# Patient Record
Sex: MALE | Race: WHITE | Employment: OTHER | URBAN - METROPOLITAN AREA
[De-identification: names, ages, dates, MRNs, and addresses within clinical notes are randomized per-mention and may not be internally consistent; named-entity substitution may affect disease eponyms.]

---

## 2018-02-03 PROBLEM — R53.1 RIGHT SIDED WEAKNESS: Status: ACTIVE | Noted: 2018-02-03

## 2018-02-04 PROBLEM — E87.1 HYPONATREMIA: Status: ACTIVE | Noted: 2018-02-04

## 2018-02-04 PROBLEM — F17.200 TOBACCO DEPENDENCE: Status: ACTIVE | Noted: 2018-02-04

## 2018-02-04 PROBLEM — E16.2 HYPOGLYCEMIA: Status: ACTIVE | Noted: 2018-02-04

## 2018-02-04 PROBLEM — F10.10 ALCOHOL ABUSE: Status: ACTIVE | Noted: 2018-02-04

## 2018-02-04 PROBLEM — I63.9 ACUTE CVA (CEREBROVASCULAR ACCIDENT) (HCC): Status: ACTIVE | Noted: 2018-02-04

## 2018-12-05 ENCOUNTER — HOSPITAL ENCOUNTER (OUTPATIENT)
Age: 58
Discharge: HOME | End: 2018-12-07
Payer: MEDICARE

## 2018-12-05 ENCOUNTER — HOSPITAL ENCOUNTER (OUTPATIENT)
Dept: GENERAL RADIOLOGY | Age: 58
Discharge: HOME | End: 2018-12-07
Payer: MEDICARE

## 2018-12-05 DIAGNOSIS — M75.102 ROTATOR CUFF SYNDROME OF LEFT SHOULDER: ICD-10-CM

## 2018-12-05 PROCEDURE — 73030 X-RAY EXAM OF SHOULDER: CPT

## 2018-12-10 ENCOUNTER — HOSPITAL ENCOUNTER (OUTPATIENT)
Dept: MRI IMAGING | Age: 58
Discharge: HOME | End: 2018-12-12
Payer: MEDICARE

## 2018-12-10 DIAGNOSIS — M75.102 TEAR OF LEFT ROTATOR CUFF, UNSPECIFIED TEAR EXTENT: ICD-10-CM

## 2018-12-10 PROCEDURE — 73221 MRI JOINT UPR EXTREM W/O DYE: CPT

## 2019-07-28 ENCOUNTER — HOSPITAL ENCOUNTER (OUTPATIENT)
Age: 59
Setting detail: OBSERVATION
Discharge: HOME | End: 2019-07-29
Attending: EMERGENCY MEDICINE | Admitting: HOSPITALIST
Payer: MEDICARE

## 2019-07-28 DIAGNOSIS — E87.1 HYPONATREMIA: ICD-10-CM

## 2019-07-28 DIAGNOSIS — R29.90 STROKE-LIKE EPISODE: Primary | ICD-10-CM

## 2019-07-28 DIAGNOSIS — D72.829 LEUKOCYTOSIS, UNSPECIFIED TYPE: ICD-10-CM

## 2019-07-28 LAB
CHP ED QC CHECK: YES
GLUCOSE BLD-MCNC: 96 MG/DL
METER GLUCOSE: 96 MG/DL (ref 74–99)

## 2019-07-28 PROCEDURE — 2580000003 HC RX 258: Performed by: STUDENT IN AN ORGANIZED HEALTH CARE EDUCATION/TRAINING PROGRAM

## 2019-07-28 PROCEDURE — 99285 EMERGENCY DEPT VISIT HI MDM: CPT

## 2019-07-28 PROCEDURE — 82962 GLUCOSE BLOOD TEST: CPT

## 2019-07-28 RX ORDER — SODIUM CHLORIDE 0.9 % (FLUSH) 0.9 %
10 SYRINGE (ML) INJECTION PRN
Status: DISCONTINUED | OUTPATIENT
Start: 2019-07-28 | End: 2019-07-29 | Stop reason: SDUPTHER

## 2019-07-28 RX ORDER — SODIUM CHLORIDE 0.9 % (FLUSH) 0.9 %
10 SYRINGE (ML) INJECTION EVERY 12 HOURS SCHEDULED
Status: DISCONTINUED | OUTPATIENT
Start: 2019-07-29 | End: 2019-07-29 | Stop reason: SDUPTHER

## 2019-07-28 RX ORDER — 0.9 % SODIUM CHLORIDE 0.9 %
1000 INTRAVENOUS SOLUTION INTRAVENOUS ONCE
Status: COMPLETED | OUTPATIENT
Start: 2019-07-29 | End: 2019-07-29

## 2019-07-28 RX ADMIN — SODIUM CHLORIDE 1000 ML: 9 INJECTION, SOLUTION INTRAVENOUS at 23:59

## 2019-07-29 ENCOUNTER — APPOINTMENT (OUTPATIENT)
Dept: CT IMAGING | Age: 59
End: 2019-07-29
Payer: MEDICARE

## 2019-07-29 ENCOUNTER — APPOINTMENT (OUTPATIENT)
Dept: ULTRASOUND IMAGING | Age: 59
End: 2019-07-29
Payer: MEDICARE

## 2019-07-29 ENCOUNTER — APPOINTMENT (OUTPATIENT)
Dept: MRI IMAGING | Age: 59
End: 2019-07-29
Payer: MEDICARE

## 2019-07-29 ENCOUNTER — APPOINTMENT (OUTPATIENT)
Dept: GENERAL RADIOLOGY | Age: 59
End: 2019-07-29
Payer: MEDICARE

## 2019-07-29 VITALS
HEIGHT: 68 IN | RESPIRATION RATE: 16 BRPM | TEMPERATURE: 97.5 F | SYSTOLIC BLOOD PRESSURE: 127 MMHG | DIASTOLIC BLOOD PRESSURE: 78 MMHG | BODY MASS INDEX: 21.22 KG/M2 | OXYGEN SATURATION: 96 % | WEIGHT: 140 LBS | HEART RATE: 84 BPM

## 2019-07-29 PROBLEM — R29.90 STROKE-LIKE SYMPTOMS: Status: ACTIVE | Noted: 2019-07-29

## 2019-07-29 PROBLEM — R29.90 STROKE-LIKE EPISODE: Status: ACTIVE | Noted: 2019-07-29

## 2019-07-29 LAB
ALBUMIN SERPL-MCNC: 3.6 G/DL (ref 3.5–5.2)
ALP BLD-CCNC: 62 U/L (ref 40–129)
ALT SERPL-CCNC: 13 U/L (ref 0–40)
ANION GAP SERPL CALCULATED.3IONS-SCNC: 17 MMOL/L (ref 7–16)
APTT: 28.1 SEC (ref 24.5–35.1)
AST SERPL-CCNC: 25 U/L (ref 0–39)
BASOPHILS ABSOLUTE: 0.04 E9/L (ref 0–0.2)
BASOPHILS RELATIVE PERCENT: 0.3 % (ref 0–2)
BILIRUB SERPL-MCNC: 0.6 MG/DL (ref 0–1.2)
BILIRUBIN URINE: NEGATIVE
BLOOD, URINE: NEGATIVE
BUN BLDV-MCNC: 5 MG/DL (ref 8–23)
CALCIUM SERPL-MCNC: 8.8 MG/DL (ref 8.6–10.2)
CHLORIDE BLD-SCNC: 87 MMOL/L (ref 98–107)
CHOLESTEROL, FASTING: 112 MG/DL (ref 0–199)
CLARITY: CLEAR
CO2: 21 MMOL/L (ref 22–29)
COLOR: YELLOW
CREAT SERPL-MCNC: 0.7 MG/DL (ref 0.7–1.2)
EKG ATRIAL RATE: 94 BPM
EKG P AXIS: 79 DEGREES
EKG P-R INTERVAL: 116 MS
EKG Q-T INTERVAL: 374 MS
EKG QRS DURATION: 64 MS
EKG QTC CALCULATION (BAZETT): 467 MS
EKG R AXIS: 70 DEGREES
EKG T AXIS: 70 DEGREES
EKG VENTRICULAR RATE: 94 BPM
EOSINOPHILS ABSOLUTE: 0.14 E9/L (ref 0.05–0.5)
EOSINOPHILS RELATIVE PERCENT: 1.1 % (ref 0–6)
FOLATE: >20 NG/ML (ref 4.8–24.2)
GFR AFRICAN AMERICAN: >60
GFR NON-AFRICAN AMERICAN: >60 ML/MIN/1.73
GLUCOSE BLD-MCNC: 90 MG/DL (ref 74–99)
GLUCOSE URINE: NEGATIVE MG/DL
HBA1C MFR BLD: 4.8 % (ref 4–5.6)
HCT VFR BLD CALC: 36.3 % (ref 37–54)
HDLC SERPL-MCNC: 47 MG/DL
HEMOGLOBIN: 12.7 G/DL (ref 12.5–16.5)
IMMATURE GRANULOCYTES #: 0.09 E9/L
IMMATURE GRANULOCYTES %: 0.7 % (ref 0–5)
INR BLD: 1.1
KETONES, URINE: NEGATIVE MG/DL
LDL CHOLESTEROL CALCULATED: 53 MG/DL (ref 0–99)
LEUKOCYTE ESTERASE, URINE: NEGATIVE
LV EF: 58 %
LVEF MODALITY: NORMAL
LYMPHOCYTES ABSOLUTE: 2.17 E9/L (ref 1.5–4)
LYMPHOCYTES RELATIVE PERCENT: 17.1 % (ref 20–42)
MAGNESIUM: 1.7 MG/DL (ref 1.6–2.6)
MCH RBC QN AUTO: 32.6 PG (ref 26–35)
MCHC RBC AUTO-ENTMCNC: 35 % (ref 32–34.5)
MCV RBC AUTO: 93.1 FL (ref 80–99.9)
MONOCYTES ABSOLUTE: 1.12 E9/L (ref 0.1–0.95)
MONOCYTES RELATIVE PERCENT: 8.8 % (ref 2–12)
NEUTROPHILS ABSOLUTE: 9.11 E9/L (ref 1.8–7.3)
NEUTROPHILS RELATIVE PERCENT: 72 % (ref 43–80)
NITRITE, URINE: NEGATIVE
PDW BLD-RTO: 13.7 FL (ref 11.5–15)
PH UA: 6 (ref 5–9)
PLATELET # BLD: 183 E9/L (ref 130–450)
PMV BLD AUTO: 9.6 FL (ref 7–12)
POTASSIUM REFLEX MAGNESIUM: 3.5 MMOL/L (ref 3.5–5)
PROTEIN UA: NEGATIVE MG/DL
PROTHROMBIN TIME: 13 SEC (ref 9.3–12.4)
RBC # BLD: 3.9 E12/L (ref 3.8–5.8)
SODIUM BLD-SCNC: 125 MMOL/L (ref 132–146)
SPECIFIC GRAVITY UA: <=1.005 (ref 1–1.03)
TOTAL PROTEIN: 7 G/DL (ref 6.4–8.3)
TRIGLYCERIDE, FASTING: 58 MG/DL (ref 0–149)
TROPONIN: <0.01 NG/ML (ref 0–0.03)
UROBILINOGEN, URINE: 0.2 E.U./DL
VITAMIN B-12: 1008 PG/ML (ref 211–946)
VLDLC SERPL CALC-MCNC: 12 MG/DL
WBC # BLD: 12.7 E9/L (ref 4.5–11.5)

## 2019-07-29 PROCEDURE — 6360000002 HC RX W HCPCS: Performed by: STUDENT IN AN ORGANIZED HEALTH CARE EDUCATION/TRAINING PROGRAM

## 2019-07-29 PROCEDURE — 96365 THER/PROPH/DIAG IV INF INIT: CPT

## 2019-07-29 PROCEDURE — 93005 ELECTROCARDIOGRAM TRACING: CPT | Performed by: STUDENT IN AN ORGANIZED HEALTH CARE EDUCATION/TRAINING PROGRAM

## 2019-07-29 PROCEDURE — 80053 COMPREHEN METABOLIC PANEL: CPT

## 2019-07-29 PROCEDURE — G0378 HOSPITAL OBSERVATION PER HR: HCPCS

## 2019-07-29 PROCEDURE — 36415 COLL VENOUS BLD VENIPUNCTURE: CPT

## 2019-07-29 PROCEDURE — 2580000003 HC RX 258: Performed by: STUDENT IN AN ORGANIZED HEALTH CARE EDUCATION/TRAINING PROGRAM

## 2019-07-29 PROCEDURE — 6360000002 HC RX W HCPCS: Performed by: HOSPITALIST

## 2019-07-29 PROCEDURE — 85730 THROMBOPLASTIN TIME PARTIAL: CPT

## 2019-07-29 PROCEDURE — 93010 ELECTROCARDIOGRAM REPORT: CPT | Performed by: INTERNAL MEDICINE

## 2019-07-29 PROCEDURE — 85025 COMPLETE CBC W/AUTO DIFF WBC: CPT

## 2019-07-29 PROCEDURE — 96366 THER/PROPH/DIAG IV INF ADDON: CPT

## 2019-07-29 PROCEDURE — 6370000000 HC RX 637 (ALT 250 FOR IP): Performed by: HOSPITALIST

## 2019-07-29 PROCEDURE — 70450 CT HEAD/BRAIN W/O DYE: CPT

## 2019-07-29 PROCEDURE — 84484 ASSAY OF TROPONIN QUANT: CPT

## 2019-07-29 PROCEDURE — 97161 PT EVAL LOW COMPLEX 20 MIN: CPT | Performed by: PHYSICAL THERAPIST

## 2019-07-29 PROCEDURE — 93306 TTE W/DOPPLER COMPLETE: CPT

## 2019-07-29 PROCEDURE — 93880 EXTRACRANIAL BILAT STUDY: CPT

## 2019-07-29 PROCEDURE — 97165 OT EVAL LOW COMPLEX 30 MIN: CPT

## 2019-07-29 PROCEDURE — 83735 ASSAY OF MAGNESIUM: CPT

## 2019-07-29 PROCEDURE — 2500000003 HC RX 250 WO HCPCS: Performed by: STUDENT IN AN ORGANIZED HEALTH CARE EDUCATION/TRAINING PROGRAM

## 2019-07-29 PROCEDURE — 70551 MRI BRAIN STEM W/O DYE: CPT

## 2019-07-29 PROCEDURE — 92523 SPEECH SOUND LANG COMPREHEN: CPT

## 2019-07-29 PROCEDURE — 83036 HEMOGLOBIN GLYCOSYLATED A1C: CPT

## 2019-07-29 PROCEDURE — 97530 THERAPEUTIC ACTIVITIES: CPT | Performed by: PHYSICAL THERAPIST

## 2019-07-29 PROCEDURE — 71045 X-RAY EXAM CHEST 1 VIEW: CPT

## 2019-07-29 PROCEDURE — 81003 URINALYSIS AUTO W/O SCOPE: CPT

## 2019-07-29 PROCEDURE — 82607 VITAMIN B-12: CPT

## 2019-07-29 PROCEDURE — 96372 THER/PROPH/DIAG INJ SC/IM: CPT

## 2019-07-29 PROCEDURE — 80061 LIPID PANEL: CPT

## 2019-07-29 PROCEDURE — 85610 PROTHROMBIN TIME: CPT

## 2019-07-29 PROCEDURE — 96376 TX/PRO/DX INJ SAME DRUG ADON: CPT

## 2019-07-29 PROCEDURE — 82746 ASSAY OF FOLIC ACID SERUM: CPT

## 2019-07-29 PROCEDURE — 97530 THERAPEUTIC ACTIVITIES: CPT

## 2019-07-29 PROCEDURE — 99218 PR INITIAL OBSERVATION CARE/DAY 30 MINUTES: CPT | Performed by: PSYCHIATRY & NEUROLOGY

## 2019-07-29 RX ORDER — TAMSULOSIN HYDROCHLORIDE 0.4 MG/1
0.4 CAPSULE ORAL DAILY
Status: DISCONTINUED | OUTPATIENT
Start: 2019-07-29 | End: 2019-07-29 | Stop reason: HOSPADM

## 2019-07-29 RX ORDER — FOLIC ACID 1 MG/1
1 TABLET ORAL DAILY
Status: DISCONTINUED | OUTPATIENT
Start: 2019-07-29 | End: 2019-07-29 | Stop reason: HOSPADM

## 2019-07-29 RX ORDER — ALBUTEROL SULFATE 90 UG/1
1 AEROSOL, METERED RESPIRATORY (INHALATION) EVERY 6 HOURS PRN
Status: DISCONTINUED | OUTPATIENT
Start: 2019-07-29 | End: 2019-07-29 | Stop reason: HOSPADM

## 2019-07-29 RX ORDER — DULOXETIN HYDROCHLORIDE 30 MG/1
30 CAPSULE, DELAYED RELEASE ORAL DAILY
Status: DISCONTINUED | OUTPATIENT
Start: 2019-07-29 | End: 2019-07-29 | Stop reason: HOSPADM

## 2019-07-29 RX ORDER — ATORVASTATIN CALCIUM 40 MG/1
40 TABLET, FILM COATED ORAL NIGHTLY
Status: DISCONTINUED | OUTPATIENT
Start: 2019-07-29 | End: 2019-07-29 | Stop reason: HOSPADM

## 2019-07-29 RX ORDER — ONDANSETRON 2 MG/ML
4 INJECTION INTRAMUSCULAR; INTRAVENOUS EVERY 6 HOURS PRN
Status: DISCONTINUED | OUTPATIENT
Start: 2019-07-29 | End: 2019-07-29 | Stop reason: HOSPADM

## 2019-07-29 RX ORDER — ASPIRIN 81 MG/1
81 TABLET ORAL DAILY
Status: DISCONTINUED | OUTPATIENT
Start: 2019-07-29 | End: 2019-07-29 | Stop reason: HOSPADM

## 2019-07-29 RX ORDER — THIAMINE MONONITRATE (VIT B1) 100 MG
100 TABLET ORAL DAILY
Status: DISCONTINUED | OUTPATIENT
Start: 2019-07-29 | End: 2019-07-29 | Stop reason: HOSPADM

## 2019-07-29 RX ORDER — SODIUM CHLORIDE 0.9 % (FLUSH) 0.9 %
10 SYRINGE (ML) INJECTION EVERY 12 HOURS SCHEDULED
Status: DISCONTINUED | OUTPATIENT
Start: 2019-07-29 | End: 2019-07-29 | Stop reason: HOSPADM

## 2019-07-29 RX ORDER — MULTIVITAMIN WITH FOLIC ACID 400 MCG
1 TABLET ORAL DAILY
Status: DISCONTINUED | OUTPATIENT
Start: 2019-07-29 | End: 2019-07-29 | Stop reason: HOSPADM

## 2019-07-29 RX ORDER — 0.9 % SODIUM CHLORIDE 0.9 %
1000 INTRAVENOUS SOLUTION INTRAVENOUS ONCE
Status: COMPLETED | OUTPATIENT
Start: 2019-07-29 | End: 2019-07-29

## 2019-07-29 RX ORDER — SODIUM CHLORIDE 0.9 % (FLUSH) 0.9 %
10 SYRINGE (ML) INJECTION PRN
Status: DISCONTINUED | OUTPATIENT
Start: 2019-07-29 | End: 2019-07-29 | Stop reason: HOSPADM

## 2019-07-29 RX ADMIN — ASPIRIN 81 MG: 81 TABLET ORAL at 10:33

## 2019-07-29 RX ADMIN — TAMSULOSIN HYDROCHLORIDE 0.4 MG: 0.4 CAPSULE ORAL at 10:32

## 2019-07-29 RX ADMIN — FOLIC ACID 1 MG: 1 TABLET ORAL at 10:32

## 2019-07-29 RX ADMIN — MULTIVITAMIN TABLET 1 TABLET: TABLET at 10:33

## 2019-07-29 RX ADMIN — TIOTROPIUM BROMIDE 18 MCG: 18 CAPSULE ORAL; RESPIRATORY (INHALATION) at 13:43

## 2019-07-29 RX ADMIN — ENOXAPARIN SODIUM 40 MG: 40 INJECTION SUBCUTANEOUS at 10:32

## 2019-07-29 RX ADMIN — THIAMINE HYDROCHLORIDE: 100 INJECTION, SOLUTION INTRAMUSCULAR; INTRAVENOUS at 01:00

## 2019-07-29 RX ADMIN — Medication 100 MG: at 10:33

## 2019-07-29 RX ADMIN — MOMETASONE FUROATE AND FORMOTEROL FUMARATE DIHYDRATE 2 PUFF: 200; 5 AEROSOL RESPIRATORY (INHALATION) at 10:33

## 2019-07-29 RX ADMIN — SODIUM CHLORIDE 1000 ML: 9 INJECTION, SOLUTION INTRAVENOUS at 01:30

## 2019-07-29 RX ADMIN — THIAMINE HYDROCHLORIDE: 100 INJECTION, SOLUTION INTRAMUSCULAR; INTRAVENOUS at 10:34

## 2019-07-29 RX ADMIN — DULOXETINE HYDROCHLORIDE 30 MG: 30 CAPSULE, DELAYED RELEASE ORAL at 10:33

## 2019-07-29 ASSESSMENT — ENCOUNTER SYMPTOMS
VOMITING: 0
TROUBLE SWALLOWING: 0
SHORTNESS OF BREATH: 0
WHEEZING: 0
COUGH: 0
CHEST TIGHTNESS: 1
BACK PAIN: 0
SINUS PRESSURE: 0
SHORTNESS OF BREATH: 1
EYE DISCHARGE: 0
EYE PAIN: 0
PHOTOPHOBIA: 0
DIARRHEA: 0
NAUSEA: 0
EYE REDNESS: 0
SORE THROAT: 0
ABDOMINAL PAIN: 0

## 2019-07-29 ASSESSMENT — PAIN SCALES - GENERAL: PAINLEVEL_OUTOF10: 0

## 2019-07-29 NOTE — PROGRESS NOTES
[facility]  Physical Therapy Initial Evaluation    Name: Krista Matthew  : 1960  MRN: [id number]    Date of Service: 2019        Evaluating Therapist: William Maya PT, DPT       Equipment Recommendations: w/w    Room #: 8211/8211-A  DIAGNOSIS: stroke like episode   PRECAUTIONS: fall risk     Social:  Pt lives with daughter in a 2 floor plan 4 steps and 1 rails to enter. Bed and bath on second floor. 1 rail on steps to second floor. Prior to admission pt walked with w/w and uses cane on steps. Initial Evaluation  Date:  Treatment      Short Term/ Long Term   Goals   Was pt agreeable to Eval/treatment? yes     Does pt have pain? No c/o pain     Bed Mobility  Rolling: SBA  Supine to sit: SBA  Sit to supine: SBA  Scooting: SBA  Modified independent   Transfers Sit to stand: min A   Stand to sit: min A   Stand pivot: min a   SBa   Ambulation    30 feet with w/w  with min A   75+ feet with w/w with SBA   Stair negotiation: ascended and descended  NT  4 steps with 1 rail with CGA   AM-PAC Raw Score 17/24       Pt is alert and Oriented x3   ROM:  L LE grossly WFL  R LE grossly WFL  Strength:   L LE grossly 4-/5  R LE grossly 3+/5  Balance: standing min A with w/w   Sensation: pt reports numbness/tingling chronic in R fingers and in R LE. Edema: none noted. Endurance: fair        ASSESSMENT  Pt displays functional ability as noted in the objective portion of this evaluation. Comments/Treatment:  Pt found laying in bed agreeable to evaluation. Pt instructed in mobility. Pt found to be incontinent of bowel. Pt assisted with change of clothes, hygiene care, and linen change. Pt left laying in bed with call button in reach end of evaluation. Patient education  Pt educated on mobility. Patient response to education:   Pt verbalized understanding Pt demonstrated skill Pt requires further education in this area     x     Rehab potential is Good for reaching above PT goals.     Pts/ family goals   1. None stated. Patient and or family understand(s) diagnosis, prognosis, and plan of care. PLAN  PT care will be provided in accordance with the objectives noted above. Whenever appropriate, clear delegation orders will be provided for nursing staff. Exercises and functional mobility practice will be used as well as appropriate assistive devices or modalities to obtain goals. Patient and family education will also be administered as needed. Frequency of treatments will be 2-5x/week x 4-5 days.     Time in: 1335  Time out: 1359  Evaluation + 10  minutes tx time    Julisa Pulliam PT, DPT   License number:  [id number]

## 2019-07-29 NOTE — ED NOTES
This nurse spoke with Senthil Sampson RN at . Confirmation of ED SBAR receipt and pending transportation request is verbalized. Patient transportation to room 8211A requested.      Damian Robins RN  07/29/19 0666

## 2019-07-29 NOTE — PROGRESS NOTES
Conservation Techniques [x]  Functional Transfer training [x] Patient and/or Family Education [x]  Functional Mobility training [x]  Environmental Modifications [x]  Cognitive re-training []   Compensatory techniques for ADLs [x]  Splinting Needs []   Positioning to improve overall function [x]   Therapeutic Activity [x]  Therapeutic Exercise  [x]  Visual/Perceptual: []    Delirium prevention/treatment  []   Other:  []    Rehab Potential: Good for established goals     Patient / Family Goal: None stated     Patient and/or family were instructed diagnosis, prognosis/goals and plan of care. Demonstrated fair understanding.     Tx Time in: 140  Tx Time out: 158  Evaluation + 18 treatment time    Savannah Horta, OTR/L [id number]

## 2019-07-29 NOTE — PROGRESS NOTES
Patient/family provided with blue stroke education packet, contents reviewed and any/all questions answered. Instructed on importance of follow up appointments.      AVS reviewed with patient/family    IV site and telepack removed

## 2019-07-29 NOTE — PROGRESS NOTES
SPEECH/LANGUAGE PATHOLOGY  SPEECH/LANGUAGE/COGNITIVE EVALUATION      PATIENT NAME:  Radha Yu      :  1960      TODAY'S DATE:  2019      SPEECH PATHOLOGY DIAGNOSIS:    Mild cognitive deficits     THERAPY RECOMMENDATIONS:   Speech Pathology intervention is not warranted at this time. Per pt, memory at baseline                  MOTOR SPEECH       Oral Peripheral Examination   Adequate lingual/labial strength     Parameters of Speech Production  Respiration:  Adequate for speech production  Articulation:  Within functional limits  Resonance:  Within functional limits  Quality:   Within functional limits  Pitch: Within functional limits  Intensity: Within functional limits  Fluency:  Intact  Prosody Intact    RECEPTIVE LANGUAGE    Comprehension of Yes/No Questions: Within normal limits    Process  Simple Verbal Commands:   Within normal limits  Process Intermediate Verbal Commands:   Within normal limits  Process Complex Verbal Commands:     Within normal limits    Comprehension of Conversation:      Within normal limits      EXPRESSIVE LANGUAGE     Serials: Functional    Imitation:  Words   Functional   Sentences Functional    Naming:  (Modality used:  Verbal)  Confrontation Naming  Functional  Functional Description  Functional  Response Naming: Functional    Conversation:      Conversation was within functional limits    COGNITION     Attention/Orientation  Attention: Sustained attention   Orientation:  Oriented to Person, Place    Memory   Biographical:  recalled Address, Birthdate, Age and Family  Delayed recall:  recalled 0/3 words    CLINICAL OBSERVATIONS NOTED DURING THE EVALUATION  Within functional limits                  Prognosis for improvements is good  This plan will be re-evaluated and revised in 1 week  if warranted. Patient stated goals: Agreed with above  Treatment goals discussed with Patient  The Patient understand the diagnosis, prognosis and plan of care.       The admitting diagnosis and active problem list, as listed below have been reviewed prior to initiation of this evaluation.      ADMITTING DIAGNOSIS: Stroke-like episode ([facility] [address] [I63.9]     ACTIVE PROBLEM LIST:   Patient Active Problem List   Diagnosis    DISABILITY EXAMINATION    Right sided weakness    Acute CVA (cerebrovascular accident) ([facility] [address]    Hyponatremia    Alcohol abuse    Hypoglycemia    Tobacco dependence    [facility]     [facility]  Speech Language Pathologist Student Intern

## 2019-07-29 NOTE — H&P
rhythm with normal S1/S2 without murmurs, rubs or gallops. Abdomen: Soft, non-tender, non-distended with normal bowel sounds. Musculoskeletal:  No clubbing, cyanosis or edema bilaterally. Full range of motion without deformity. Skin: Skin color, texture, turgor normal.  No rashes or lesions. Neurologic:  Neurovascularly intact without any focal sensory/motor deficits. Cranial nerves: II-XII intact, grossly non-focal.  Psychiatric:  Alert and oriented, thought content appropriate, normal insight    EKG:  I have reviewed the EKG with the following interpretation: sinus tachycardia    . CT Head WO Contrast   Final Result   No CT evidence for an acute intracranial process. There is no CT evidence for    acute hemorrhage, acute infarct or mass. This report has been electronically signed by Smiley Gentile DO. XR CHEST PORTABLE   Final Result   No acute cardiopulmonary process.       MRI BRAIN WO CONTRAST    (Results Pending)   US CAROTID ARTERY BILATERAL    (Results Pending)         Labs:     Recent Labs     07/29/19  0001   WBC 12.7*   HGB 12.7   HCT 36.3*        Recent Labs     07/29/19  0001   *   K 3.5   CL 87*   CO2 21*   BUN 5*   CREATININE 0.7   CALCIUM 8.8     Recent Labs     07/29/19  0001   AST 25   ALT 13   BILITOT 0.6   ALKPHOS 62     Recent Labs     07/29/19  0001   INR 1.1     Recent Labs     07/29/19  0001   TROPONINI <0.01       Urinalysis:      Lab Results   Component Value Date    NITRU Negative 07/29/2019    BLOODU Negative 07/29/2019    SPECGRAV <=1.005 07/29/2019    GLUCOSEU Negative 07/29/2019         ASSESSMENT:  Stroke like symptoms  Alcohol intoxication w/ hx of abuse  Hyponatremia   Cerebral artery occlusion with hx of cerebral cva  COPD  Depression  HTN  Mild leukocytosis - likely 2/2 dehydration     PLAN:  Neuro workup pending  Neurology consulted  CIWA   Monitor labs  Continue home meds  PT/OT  Pt is hoping to go home today       DVT Prophylaxis: lovenox  Diet: DIET

## 2019-07-29 NOTE — ED PROVIDER NOTES
breath sounds normal. No respiratory distress. He has no wheezes. He has no rales. Tight; Diminished b/l   Abdominal: Soft. Bowel sounds are normal. There is no tenderness. There is no rebound and no guarding. Musculoskeletal: He exhibits no edema. Neurological: No cranial nerve deficit. Coordination abnormal.   Decreased motor strength all extremities - worse RLE. AOx1   Skin: Skin is warm and dry. Nursing note and vitals reviewed. Procedures    NIH Stroke Scale      Interval: Baseline  Time: 1:36 AM  Person Administering Scale: Torie Wu    Administer stroke scale items in the order listed. Record performance in each category after each subscale exam. Do not go back and change scores. Follow directions provided for each exam technique. Scores should reflect what the patient does, not what the clinician thinks the patient can do. The clinician should record answers while administering the exam and work quickly. Except where indicated, the patient should not be coached (i.e., repeated requests to patient to make a special effort). 1a  Level of consciousness: 0=alert; keenly responsive   1b. LOC questions:  0=Performs both tasks correctly   1c. LOC commands: 0=Performs both tasks correctly   2. Best Gaze: 0=normal   3. Visual: 0=No visual loss   4. Facial Palsy: 0=Normal symmetric movement   5a. Motor left arm: 2=Some effort against gravity, limb cannot get to or maintain (if cured) 90 (or 45) degrees, drifts down to bed, but has some effort against gravity   5b.   Motor right arm: 2=Some effort against gravity, limb cannot get to or maintain (if cured) 90 (or 45) degrees, drifts down to bed, but has some effort against gravity   6a. motor left le=Some effort against gravity, limb cannot get to or maintain (if cured) 90 (or 45) degrees, drifts down to bed, but has some effort against gravity   6b  Motor right le=Some effort against gravity, limb cannot get to or maintain (if cured) 90 Finger surgery (Left). Social History:  reports that he has been smoking. He has been smoking about 2.00 packs per day. He has quit using smokeless tobacco. He reports that he drinks about 12.0 standard drinks of alcohol per week. He reports that he has current or past drug history. Drug: Marijuana. Family History: family history is not on file. The patients home medications have been reviewed.     Allergies: Bee venom    -------------------------------------------------- RESULTS -------------------------------------------------    LABS:  Results for orders placed or performed during the hospital encounter of 07/28/19   CBC Auto Differential   Result Value Ref Range    WBC 12.7 (H) 4.5 - 11.5 E9/L    RBC 3.90 3.80 - 5.80 E12/L    Hemoglobin 12.7 12.5 - 16.5 g/dL    Hematocrit 36.3 (L) 37.0 - 54.0 %    MCV 93.1 80.0 - 99.9 fL    MCH 32.6 26.0 - 35.0 pg    MCHC 35.0 (H) 32.0 - 34.5 %    RDW 13.7 11.5 - 15.0 fL    Platelets 586 439 - 225 E9/L    MPV 9.6 7.0 - 12.0 fL    Neutrophils % 72.0 43.0 - 80.0 %    Immature Granulocytes % 0.7 0.0 - 5.0 %    Lymphocytes % 17.1 (L) 20.0 - 42.0 %    Monocytes % 8.8 2.0 - 12.0 %    Eosinophils % 1.1 0.0 - 6.0 %    Basophils % 0.3 0.0 - 2.0 %    Neutrophils # 9.11 (H) 1.80 - 7.30 E9/L    Immature Granulocytes # 0.09 E9/L    Lymphocytes # 2.17 1.50 - 4.00 E9/L    Monocytes # 1.12 (H) 0.10 - 0.95 E9/L    Eosinophils # 0.14 0.05 - 0.50 E9/L    Basophils # 0.04 0.00 - 0.20 E9/L   Comprehensive Metabolic Panel w/ Reflex to MG   Result Value Ref Range    Sodium 125 (L) 132 - 146 mmol/L    Potassium reflex Magnesium 3.5 3.5 - 5.0 mmol/L    Chloride 87 (L) 98 - 107 mmol/L    CO2 21 (L) 22 - 29 mmol/L    Anion Gap 17 (H) 7 - 16 mmol/L    Glucose 90 74 - 99 mg/dL    BUN 5 (L) 8 - 23 mg/dL    CREATININE 0.7 0.7 - 1.2 mg/dL    GFR Non-African American >60 >=60 mL/min/1.73    GFR African American >60     Calcium 8.8 8.6 - 10.2 mg/dL    Total Protein 7.0 6.4 - 8.3 g/dL    Alb 3.6 3.5 -

## 2020-02-05 ENCOUNTER — HOSPITAL ENCOUNTER (EMERGENCY)
Age: 60
Discharge: HOME | End: 2020-02-06
Attending: EMERGENCY MEDICINE
Payer: MEDICARE

## 2020-02-05 ENCOUNTER — APPOINTMENT (OUTPATIENT)
Dept: CT IMAGING | Age: 60
End: 2020-02-05
Payer: MEDICARE

## 2020-02-05 ENCOUNTER — APPOINTMENT (OUTPATIENT)
Dept: GENERAL RADIOLOGY | Age: 60
End: 2020-02-05
Payer: MEDICARE

## 2020-02-05 VITALS
SYSTOLIC BLOOD PRESSURE: 135 MMHG | RESPIRATION RATE: 18 BRPM | HEART RATE: 100 BPM | DIASTOLIC BLOOD PRESSURE: 101 MMHG | OXYGEN SATURATION: 95 % | TEMPERATURE: 97.9 F

## 2020-02-05 LAB
BASOPHILS ABSOLUTE: 0.07 E9/L (ref 0–0.2)
BASOPHILS RELATIVE PERCENT: 0.8 % (ref 0–2)
EOSINOPHILS ABSOLUTE: 0.3 E9/L (ref 0.05–0.5)
EOSINOPHILS RELATIVE PERCENT: 3.2 % (ref 0–6)
HCT VFR BLD CALC: 41.6 % (ref 37–54)
HEMOGLOBIN: 14.1 G/DL (ref 12.5–16.5)
IMMATURE GRANULOCYTES #: 0.03 E9/L
IMMATURE GRANULOCYTES %: 0.3 % (ref 0–5)
LYMPHOCYTES ABSOLUTE: 1.46 E9/L (ref 1.5–4)
LYMPHOCYTES RELATIVE PERCENT: 15.7 % (ref 20–42)
MCH RBC QN AUTO: 31.3 PG (ref 26–35)
MCHC RBC AUTO-ENTMCNC: 33.9 % (ref 32–34.5)
MCV RBC AUTO: 92.2 FL (ref 80–99.9)
MONOCYTES ABSOLUTE: 0.98 E9/L (ref 0.1–0.95)
MONOCYTES RELATIVE PERCENT: 10.5 % (ref 2–12)
NEUTROPHILS ABSOLUTE: 6.45 E9/L (ref 1.8–7.3)
NEUTROPHILS RELATIVE PERCENT: 69.5 % (ref 43–80)
PDW BLD-RTO: 13.5 FL (ref 11.5–15)
PLATELET # BLD: 216 E9/L (ref 130–450)
PMV BLD AUTO: 9 FL (ref 7–12)
RBC # BLD: 4.51 E12/L (ref 3.8–5.8)
WBC # BLD: 9.3 E9/L (ref 4.5–11.5)

## 2020-02-05 PROCEDURE — 71045 X-RAY EXAM CHEST 1 VIEW: CPT

## 2020-02-05 PROCEDURE — 70450 CT HEAD/BRAIN W/O DYE: CPT

## 2020-02-05 PROCEDURE — 99285 EMERGENCY DEPT VISIT HI MDM: CPT

## 2020-02-05 PROCEDURE — 36415 COLL VENOUS BLD VENIPUNCTURE: CPT

## 2020-02-05 PROCEDURE — 84484 ASSAY OF TROPONIN QUANT: CPT

## 2020-02-05 PROCEDURE — 85025 COMPLETE CBC W/AUTO DIFF WBC: CPT

## 2020-02-05 PROCEDURE — 80048 BASIC METABOLIC PNL TOTAL CA: CPT

## 2020-02-05 PROCEDURE — 80307 DRUG TEST PRSMV CHEM ANLYZR: CPT

## 2020-02-05 PROCEDURE — G0480 DRUG TEST DEF 1-7 CLASSES: HCPCS

## 2020-02-05 PROCEDURE — 93005 ELECTROCARDIOGRAM TRACING: CPT | Performed by: EMERGENCY MEDICINE

## 2020-02-06 LAB
ACETAMINOPHEN LEVEL: <5 MCG/ML (ref 10–30)
AMPHETAMINE SCREEN, URINE: POSITIVE
ANION GAP SERPL CALCULATED.3IONS-SCNC: 10 MMOL/L (ref 7–16)
BARBITURATE SCREEN URINE: NOT DETECTED
BENZODIAZEPINE SCREEN, URINE: POSITIVE
BILIRUBIN URINE: NEGATIVE
BLOOD, URINE: NEGATIVE
BUN BLDV-MCNC: 5 MG/DL (ref 8–23)
CALCIUM SERPL-MCNC: 9.3 MG/DL (ref 8.6–10.2)
CANNABINOID SCREEN URINE: POSITIVE
CHLORIDE BLD-SCNC: 87 MMOL/L (ref 98–107)
CLARITY: CLEAR
CO2: 28 MMOL/L (ref 22–29)
COCAINE METABOLITE SCREEN URINE: NOT DETECTED
COLOR: ABNORMAL
CREAT SERPL-MCNC: 0.9 MG/DL (ref 0.7–1.2)
EKG ATRIAL RATE: 96 BPM
EKG P AXIS: 81 DEGREES
EKG P-R INTERVAL: 138 MS
EKG Q-T INTERVAL: 360 MS
EKG QRS DURATION: 78 MS
EKG QTC CALCULATION (BAZETT): 454 MS
EKG R AXIS: 71 DEGREES
EKG T AXIS: 70 DEGREES
EKG VENTRICULAR RATE: 96 BPM
ETHANOL: <10 MG/DL (ref 0–0.08)
FENTANYL SCREEN, URINE: NOT DETECTED
GFR AFRICAN AMERICAN: >60
GFR NON-AFRICAN AMERICAN: >60 ML/MIN/1.73
GLUCOSE BLD-MCNC: 116 MG/DL (ref 74–99)
GLUCOSE URINE: NEGATIVE MG/DL
KETONES, URINE: ABNORMAL MG/DL
LEUKOCYTE ESTERASE, URINE: NEGATIVE
Lab: ABNORMAL
METHADONE SCREEN, URINE: NOT DETECTED
NITRITE, URINE: NEGATIVE
OPIATE SCREEN URINE: NOT DETECTED
OXYCODONE URINE: NOT DETECTED
PH UA: 7 (ref 5–9)
PHENCYCLIDINE SCREEN URINE: NOT DETECTED
POTASSIUM REFLEX MAGNESIUM: 4.5 MMOL/L (ref 3.5–5)
PROTEIN UA: NEGATIVE MG/DL
SALICYLATE, SERUM: <0.3 MG/DL (ref 0–30)
SODIUM BLD-SCNC: 125 MMOL/L (ref 132–146)
SPECIFIC GRAVITY UA: 1.01 (ref 1–1.03)
TRICYCLIC ANTIDEPRESSANTS SCREEN SERUM: NEGATIVE NG/ML
TROPONIN: <0.01 NG/ML (ref 0–0.03)
UROBILINOGEN, URINE: 0.2 E.U./DL

## 2020-02-06 PROCEDURE — 81003 URINALYSIS AUTO W/O SCOPE: CPT

## 2020-02-06 PROCEDURE — 80307 DRUG TEST PRSMV CHEM ANLYZR: CPT

## 2020-02-06 PROCEDURE — 93010 ELECTROCARDIOGRAM REPORT: CPT | Performed by: INTERNAL MEDICINE

## 2020-02-06 NOTE — ED PROVIDER NOTES
-------------------------------------------------  All laboratory and radiology results have been personally reviewed by myself   LABS:  Results for orders placed or performed during the hospital encounter of 02/05/20   CBC Auto Differential   Result Value Ref Range    WBC 9.3 4.5 - 11.5 E9/L    RBC 4.51 3.80 - 5.80 E12/L    Hemoglobin 14.1 12.5 - 16.5 g/dL    Hematocrit 41.6 37.0 - 54.0 %    MCV 92.2 80.0 - 99.9 fL    MCH 31.3 26.0 - 35.0 pg    MCHC 33.9 32.0 - 34.5 %    RDW 13.5 11.5 - 15.0 fL    Platelets 305 829 - 030 E9/L    MPV 9.0 7.0 - 12.0 fL    Neutrophils % 69.5 43.0 - 80.0 %    Immature Granulocytes % 0.3 0.0 - 5.0 %    Lymphocytes % 15.7 (L) 20.0 - 42.0 %    Monocytes % 10.5 2.0 - 12.0 %    Eosinophils % 3.2 0.0 - 6.0 %    Basophils % 0.8 0.0 - 2.0 %    Neutrophils Absolute 6.45 1.80 - 7.30 E9/L    Immature Granulocytes # 0.03 E9/L    Lymphocytes Absolute 1.46 (L) 1.50 - 4.00 E9/L    Monocytes Absolute 0.98 (H) 0.10 - 0.95 E9/L    Eosinophils Absolute 0.30 0.05 - 0.50 E9/L    Basophils Absolute 0.07 0.00 - 0.20 N2/C   Basic Metabolic Panel w/ Reflex to MG   Result Value Ref Range    Sodium 125 (L) 132 - 146 mmol/L    Potassium reflex Magnesium 4.5 3.5 - 5.0 mmol/L    Chloride 87 (L) 98 - 107 mmol/L    CO2 28 22 - 29 mmol/L    Anion Gap 10 7 - 16 mmol/L    Glucose 116 (H) 74 - 99 mg/dL    BUN 5 (L) 8 - 23 mg/dL    CREATININE 0.9 0.7 - 1.2 mg/dL    GFR Non-African American >60 >=60 mL/min/1.73    GFR African American >60     Calcium 9.3 8.6 - 10.2 mg/dL   Troponin   Result Value Ref Range    Troponin <0.01 0.00 - 0.03 ng/mL   Serum Drug Screen   Result Value Ref Range    Ethanol Lvl <10 mg/dL    Acetaminophen Level <5.0 (L) 10.0 - 47.2 mcg/mL    Salicylate, Serum <3.6 0.0 - 30.0 mg/dL    TCA Scrn NEGATIVE Cutoff:300 ng/mL   Urinalysis, reflex to microscopic   Result Value Ref Range    Color, UA DARK YELLOW (A) Straw/Yellow    Clarity, UA Clear Clear    Glucose, Ur Negative Negative mg/dL    Bilirubin Urine Negative Negative    Ketones, Urine TRACE (A) Negative mg/dL    Specific Gravity, UA 1.015 1.005 - 1.030    Blood, Urine Negative Negative    pH, UA 7.0 5.0 - 9.0    Protein, UA Negative Negative mg/dL    Urobilinogen, Urine 0.2 <2.0 E.U./dL    Nitrite, Urine Negative Negative    Leukocyte Esterase, Urine Negative Negative   Urine Drug Screen   Result Value Ref Range    Amphetamine Screen, Urine POSITIVE (A) Negative <1000 ng/mL    Barbiturate Screen, Ur NOT DETECTED Negative < 200 ng/mL    Benzodiazepine Screen, Urine POSITIVE (A) Negative < 200 ng/mL    Cannabinoid Scrn, Ur POSITIVE (A) Negative < 50ng/mL    Cocaine Metabolite Screen, Urine NOT DETECTED Negative < 300 ng/mL    Opiate Scrn, Ur NOT DETECTED Negative < 300ng/mL    PCP Screen, Urine NOT DETECTED Negative < 25 ng/mL    Methadone Screen, Urine NOT DETECTED Negative <300 ng/mL    Oxycodone Urine NOT DETECTED Negative <100 ng/mL    FENTANYL SCREEN, URINE NOT DETECTED Negative <1 ng/mL    Drug Screen Comment: see below    EKG 12 Lead   Result Value Ref Range    Ventricular Rate 96 BPM    Atrial Rate 96 BPM    P-R Interval 138 ms    QRS Duration 78 ms    Q-T Interval 360 ms    QTc Calculation (Bazett) 454 ms    P Axis 81 degrees    R Axis 71 degrees    T Axis 70 degrees       RADIOLOGY:  Interpreted by Radiologist.  CT Head WO Contrast   Final Result   No indication for an acute intracranial process. See above   comments. XR CHEST PORTABLE   Final Result   No acute cardiopulmonary process. EKG: This EKG is signed and interpreted by the EP. Time: 2354  Rate: 96bpm  Rhythm: Sinus  Interpretation: non-specific ST/T wave changes, V2, aVL  Comparison: No significant changes compared to previous EKG on 07/29/2019    ------------------------- NURSING NOTES AND VITALS REVIEWED ---------------------------   The nursing notes within the ED encounter and vital signs as below have been reviewed.    BP (!) 135/101   Pulse 100   Temp 97.9 causing him to fall. Patient's laboratory work reveals a normal CBC. Normal chemistry except for a sodium 125 which is the patient's baseline chloride 87 and a glucose of 116. Troponin negative. Serum drug screen negative. Urine drug screen negative except for being positive for benzos THC and amphetamines. Urinalysis negative. EKG unchanged. Head CT shows no acute pathology. Chest x-ray unremarkable. Patient is ambulating in the emergency department without difficulty. Patient has chronic right-sided deficits which the patient's family as well as the patient states are unchanged. Patient has steady gait. He is safe for discharge home with close PCP follow-up. Pt is stable and will be DC'd home with instructions to follow up with his PCP. He is educated on signs and symptoms that require emergent evaluation. Pt is advised to return to the ED if his symptoms change or worsen. Pt is agreeable to plan and all questions have been answered at this time. Re-Evaluation:  Time: 0126  Re-evaluation. Patients symptoms are improving  Repeat physical examination is improved    Counseling: The emergency provider has spoken with the patient and discussed todays results, in addition to providing specific details for the plan of care and counseling regarding the diagnosis and prognosis. Questions are answered at this time and they are agreeable with the plan.      ---------------------------- IMPRESSION AND DISPOSITION -------------------------------    IMPRESSION  1. Fall, initial encounter    2. Hyponatremia        DISPOSITION  Disposition: Discharge to home  Patient condition is stable    2/5/20, 11:05 PM.    This note is prepared by Sean Vail, acting as Scribe for [employer] DO. [employer], DO:  The scribe's documentation has been prepared under my direction and personally reviewed by me in its entirety.   I confirm that the note above accurately reflects all work, treatment, procedures, and medical decision making performed by me.       Rodger Granado DO  02/06/20 0136 stretcher

## 2020-02-06 NOTE — ED NOTES
Bed: HB  Expected date:   Expected time:   Means of arrival:   Comments:  Reyes Esters falls Audrie Maxwell, LILO  02/05/20 [id number]

## 2020-06-07 ENCOUNTER — HOSPITAL ENCOUNTER (EMERGENCY)
Age: 60
Discharge: HOME | End: 2020-06-07
Payer: MEDICARE

## 2020-06-07 VITALS
HEART RATE: 103 BPM | SYSTOLIC BLOOD PRESSURE: 122 MMHG | OXYGEN SATURATION: 99 % | TEMPERATURE: 97.4 F | BODY MASS INDEX: 21.29 KG/M2 | DIASTOLIC BLOOD PRESSURE: 84 MMHG | WEIGHT: 140 LBS | RESPIRATION RATE: 16 BRPM

## 2020-06-07 PROCEDURE — 99212 OFFICE O/P EST SF 10 MIN: CPT

## 2020-06-07 RX ORDER — LISINOPRIL 10 MG/1
10 TABLET ORAL DAILY
COMMUNITY